# Patient Record
Sex: MALE
[De-identification: names, ages, dates, MRNs, and addresses within clinical notes are randomized per-mention and may not be internally consistent; named-entity substitution may affect disease eponyms.]

---

## 2021-09-26 ENCOUNTER — HOSPITAL ENCOUNTER (EMERGENCY)
Dept: HOSPITAL 56 - MW.ED | Age: 31
Discharge: HOME | End: 2021-09-26
Payer: MEDICAID

## 2021-09-26 DIAGNOSIS — Z79.4: ICD-10-CM

## 2021-09-26 DIAGNOSIS — Z79.899: ICD-10-CM

## 2021-09-26 DIAGNOSIS — E11.9: ICD-10-CM

## 2021-09-26 DIAGNOSIS — I10: ICD-10-CM

## 2021-09-26 DIAGNOSIS — U07.1: Primary | ICD-10-CM

## 2021-09-26 PROCEDURE — U0002 COVID-19 LAB TEST NON-CDC: HCPCS

## 2021-09-26 NOTE — EDM.PDOC
ED HPI GENERAL MEDICAL PROBLEM





- General


Stated Complaint: SHORTNESS OF BREATH, COUGHING


Time Seen by Provider: 09/26/21 05:16





- History of Present Illness


INITIAL COMMENTS - FREE TEXT/NARRATIVE: 





HISTORY AND PHYSICAL:





History of present illness:


Is a 31-year-old gentleman with history significant for hypertension diabetes 

who presents ER today secondary to a positive Covid exposure.  Patient reports 

that he has had both Covid vaccinations ready.  Patient reports he had a slight 

cough for 2 to 3 days.  Patient denies any recent fevers, shakes, chills, 

nausea, vomiting, diarrhea, dysuria, frequency or urgency, shortness of breath.





Review of systems: 


As per history of present illness and below otherwise all systems reviewed and 

negative.





Past medical history: 


As per history of present illness and as reviewed below otherwise 

noncontributory.





Surgical history: 


As per history of present illness and as reviewed below otherwise 

noncontributory.





Social history: 


No reported history of drug abuse.





Family history: 


As per history of present illness and as reviewed below otherwise 

noncontributory.





Physical exam:





This patient was seen and evaluated during the 2020 SARS-CoV-2 novel coronavirus

pandemic period.  Community viral transmission is ongoing at time of this 

encounter and the emergency department is operating under pandemic response 

procedures.





Constitutional: Patient is oriented to person, place, and time.  Appears 

well-developed and well-nourished.  No distress.


 HEENT: Moist mucous membranes


 Head: Normocephalic and atraumatic


 Eyes: Right eye exhibits no discharge.  Left eye exhibits no discharge.  No 

scleral icterus


 Neck: Normal range of motion.  No tracheal deviation present.


 Cardiovascular: Normal rate and regular rhythm.


 Pulmonary: Effort normal, no respiratory distress.  No wheezing rales or 

rhonchi.


 Abdominal: No distention


 Musculoskeletal: Normal range of motion


 Neurologic: Alert and oriented to person, place and time.


 Skin: Pink, warm and dry.  


 Psychiatric: Normal mood and affect.  Behavior is normal.  Judgment and thought

content normal.


 Nursing note and vital signs have been reviewed











Diagnostics:


Covid test: Positive





Therapeutics:


I have discussed with the patient the risk and benefits of Regeneron and he is 

currently amenable to therapy.  Form has been faxed to the infusion center.  

Patient is clinically hemodynamic stable this time for outpatient management of 

his coronavirus.





Assessment and plan:


Is a 31-year-old gentleman who presents ER today secondary to Covid exposure.  

Patient reports that his boyfriend was tested positive earlier today in [facility] for coronavirus and he would like to be tested.  Patient is otherwise 

clinically hemodynamically stable.  Patient's pulse oximeter is 97% on room air.


Given patient's request, we will go ahead and order Covid test on him and 

reevaluate after results.





Definitive disposition and diagnosis as appropriate pending reevaluation and 

review of above.











- Related Data


                                    Allergies











Allergy/AdvReac Type Severity Reaction Status Date / Time


 


No Known Allergies Allergy   Verified 09/26/21 06:04











Home Meds: 


                                    Home Meds





Insulin Aspart [NovoLOG] 15 units SQ TIDMEALS 09/26/21 [History]


Insulin Glarg,Human.Rec.Analog [Lantus Solostar] 100 units SQ DAILY 09/26/21 

[History]


Sertraline HCl 200 mg PO DAILY 09/26/21 [History]


lisinopriL [Lisinopril] 10 mg PO DAILY 09/26/21 [History]


metFORMIN HCl [Metformin ER Gastric] 1,000 mg PO BID 09/26/21 [History]











ED ROS GENERAL





- Review of Systems


Review Of Systems: See Below





ED EXAM, GENERAL





- Physical Exam


Exam: See Below





Course





- Vital Signs


Last Recorded V/S: 


                                Last Vital Signs











Temp  96.8 F L  09/26/21 06:16


 


Pulse  86   09/26/21 06:16


 


Resp  18   09/26/21 06:16


 


BP  138/97 H  09/26/21 06:16


 


Pulse Ox  97   09/26/21 06:16














- Orders/Labs/Meds


Labs: 


                                Laboratory Tests











  09/26/21 Range/Units





  06:10 


 


SARS-CoV-2 RNA (LONNIE)  POSITIVE H  (NEGATIVE)  














Departure





- Departure


Time of Disposition: 07:09


Disposition: Home, Self-Care 01


Condition: Good


Clinical Impression: 


 COVID-19 virus infection








- Discharge Information


Instructions:  COVID-19 Frequently Asked Questions, When You've Been Fully 

Vaccinated: How to Protect Yourself and Others - CDC (05/13/2021), What You 

Should Know About COVID-19 to Protect Yourself and Others - CDC, COVID-19: 

Quarantine vs. Isolation - CDC (12/17/2020)


Referrals: 


PCP,None [Primary Care Provider] - 


Additional Instructions: 


You were seen and evaluated in ER today secondary to concerns for Covid.


Your Covid test is:Positive





As we discussed, you are interested in receiving Regeneron.  Please read the 

fact sheet that has been given to you to further answer any questions that you 

might have.  You will be given a phone call by the infusion center on Monday to 

give the information regarding time and place of infusion.





1.  Your COVID-19 screening is positive.  That means you do have the coronavirus

and you are considered contagious.  Your vital signs and oxygen saturation are 

well enough that you were able to monitor your symptoms at home.  Continue to 

monitor for trouble breathing,  new confusion or inability to arouse, bluish 

lips or face or any of the other symptoms we discussed -if this occurs please 

return to the emergency room.


2.  Please self quarantine over the next 10 days.  Inform any persons that you 

have been in contact with since you started becoming symptomatic that you have 

tested positive; they should be made aware and take the appropriate steps as 

needed.


3. You can take NyQuil during the evening to help get a restful night sleep. May

alternate Tylenol and ibuprofen as needed for pain and fever management.


4. The [facility] department will be calling you and following up with you. 

The [place] COVID 19 Hotline phone number [phone], They are open Monday - 

Friday 7am - 7pm. Follow up with your primary care provider for re-evaluation 

and re-testing after the 10 day quarantine and discuss when you should be seen.








The following information is given to patients seen in the emergency department 

who are being discharged to home. This information is to outline your options 

for follow-up care. We provide all patients seen in our emergency department 

with a follow-up referral.





The need for follow-up, as well as the timing and circumstances, are variable 

depending upon the specifics of your emergency department visit.





If you don't have a primary care physician on staff, we will provide you with a 

referral. We always advise you to contact your personal physician following an 

emergency department visit to inform them of the circumstance of the visit and 

for follow-up with them and/or the need for any referrals to a consulting spe

cialist.





The emergency department will also refer you to a specialist when appropriate. 

This referral assures that you have the opportunity for follow-up care with a 

specialist. All of these measure are taken in an effort to provide you with 

optimal care, which includes your follow-up.





Under all circumstances we always encourage you to contact your private 

physician who remains a resource for coordinating your care. When calling for 

follow-up care, please make the office aware that this follow-up is from your 

recent emergency room visit. If for any reason you are refused follow-up, please

contact the [facility] Emergency Department

at [phone] and asked to speak to the emergency department charge nurse.





[facility]


[address] [place] [id number]


Phone: [phone]


Fax: [phone]








[address] [address]


Phone: [phone]


Fax: [phone]








Sepsis Event Note (ED)





- Focused Exam


Vital Signs: 


                                   Vital Signs











  Temp Pulse Resp BP Pulse Ox


 


 09/26/21 06:16  96.8 F L  86  18  138/97 H  97